# Patient Record
Sex: MALE | Race: WHITE | URBAN - METROPOLITAN AREA
[De-identification: names, ages, dates, MRNs, and addresses within clinical notes are randomized per-mention and may not be internally consistent; named-entity substitution may affect disease eponyms.]

---

## 2018-08-23 ENCOUNTER — OFFICE VISIT (OUTPATIENT)
Dept: URBAN - METROPOLITAN AREA CLINIC 81 | Facility: CLINIC | Age: 73
End: 2018-08-23

## 2018-08-23 DIAGNOSIS — H26.493 OTHER SECONDARY CATARACT, BILATERAL: ICD-10-CM

## 2018-08-23 PROCEDURE — 92014 COMPRE OPH EXAM EST PT 1/>: CPT | Performed by: OPTOMETRIST

## 2018-08-23 ASSESSMENT — VISUAL ACUITY
OD: 20/25
OS: 20/20

## 2018-08-23 ASSESSMENT — INTRAOCULAR PRESSURE
OD: 9
OS: 10

## 2018-08-23 NOTE — IMPRESSION/PLAN
Impression: Other secondary cataract, bilateral: H26.493. Pt will think about YAG surgery in future. Plan: Pt understands that condition can be influencing BVA. Discussed possible YAG consult.

## 2018-08-23 NOTE — IMPRESSION/PLAN
Impression: Dry eye syndrome of bilateral lacrimal glands: H04.123. Plan: Reviewed AT with lid closure, gel or gwen hs & omega 3's.

## 2018-08-23 NOTE — IMPRESSION/PLAN
Impression: Presence of intraocular lens: Z96.1. Plan: Will continue to observe/monitor. UV protection. Pt understands.

## 2018-08-23 NOTE — IMPRESSION/PLAN
Impression: Vitreous degeneration, bilateral: H43.813. Plan: No signs of retinal hole, tear or detachment. Reviewed SSRD, discussed potential seriousness of condition, and importance to RTC immediately if detect any SSRD, pt understands.

## 2018-08-23 NOTE — IMPRESSION/PLAN
Impression: Presbyopia: H52.4. Plan: Glasses Rx update option given. Recommend UV protection. Discussed adaptation, pt understands.

## 2020-01-13 ENCOUNTER — OFFICE VISIT (OUTPATIENT)
Dept: URBAN - METROPOLITAN AREA CLINIC 81 | Facility: CLINIC | Age: 75
End: 2020-01-13

## 2020-01-13 DIAGNOSIS — H52.4 PRESBYOPIA: Primary | ICD-10-CM

## 2020-01-13 DIAGNOSIS — H04.123 DRY EYE SYNDROME OF BILATERAL LACRIMAL GLANDS: ICD-10-CM

## 2020-01-13 PROCEDURE — 92014 COMPRE OPH EXAM EST PT 1/>: CPT | Performed by: OPTOMETRIST

## 2020-01-13 ASSESSMENT — KERATOMETRY
OS: 42.13
OD: 42.00

## 2020-01-13 ASSESSMENT — VISUAL ACUITY
OS: 20/20
OD: 20/20

## 2020-01-13 ASSESSMENT — INTRAOCULAR PRESSURE
OD: 12
OS: 12

## 2020-01-13 NOTE — IMPRESSION/PLAN
Impression: Presbyopia: H52.4. Plan: New spec Rx given - Discussed changes and possible adaptation period. 

RTC 1 year/PRN

## 2020-01-13 NOTE — IMPRESSION/PLAN
Impression: Other secondary cataract, bilateral: H26.493. 

OD>OS - vision ok OU Plan: Monitor - Discussed YAG cap - hold until vision worsens/symptomatic

## 2021-06-17 ENCOUNTER — OFFICE VISIT (OUTPATIENT)
Dept: URBAN - METROPOLITAN AREA CLINIC 81 | Facility: CLINIC | Age: 76
End: 2021-06-17

## 2021-06-17 DIAGNOSIS — Z96.1 PRESENCE OF INTRAOCULAR LENS: ICD-10-CM

## 2021-06-17 DIAGNOSIS — H43.813 VITREOUS DEGENERATION, BILATERAL: ICD-10-CM

## 2021-06-17 PROCEDURE — 92014 COMPRE OPH EXAM EST PT 1/>: CPT | Performed by: OPTOMETRIST

## 2021-06-17 ASSESSMENT — INTRAOCULAR PRESSURE
OS: 12
OD: 13

## 2021-06-17 ASSESSMENT — KERATOMETRY
OS: 42.38
OD: 42.25

## 2021-06-17 NOTE — IMPRESSION/PLAN
Impression: Diagnosis: Vitreous degeneration, bilateral. Code: W10.367. Plan: Posterior vitreous detachment accounts for the patient's complaints. Discussed signs and symptoms of PVD/floaters. Signs and symptoms of retinal detachment were discussed in detail. There is no evidence of retina pathology. No treatment is required at this time. Patient instructed to call immediately if any signs or symptoms of retinal detachments occur.

## 2021-06-17 NOTE — IMPRESSION/PLAN
Impression: Other secondary cataract, bilateral: H26.493. Plan: Discussed diagnosis with patient in detail, stable OU. Not affecting patients daily life. No treatment is required at this time. Will continue to observe condition and/or symptoms. Patient instructed to call if condition gets worse.

## 2022-06-01 ENCOUNTER — OFFICE VISIT (OUTPATIENT)
Dept: URBAN - METROPOLITAN AREA CLINIC 81 | Facility: CLINIC | Age: 77
End: 2022-06-01
Payer: COMMERCIAL

## 2022-06-01 DIAGNOSIS — H43.813 VITREOUS DEGENERATION, BILATERAL: ICD-10-CM

## 2022-06-01 DIAGNOSIS — H52.4 PRESBYOPIA: ICD-10-CM

## 2022-06-01 DIAGNOSIS — H26.493 OTHER SECONDARY CATARACT, BILATERAL: Primary | ICD-10-CM

## 2022-06-01 PROCEDURE — 92014 COMPRE OPH EXAM EST PT 1/>: CPT | Performed by: OPTOMETRIST

## 2022-06-01 ASSESSMENT — INTRAOCULAR PRESSURE
OD: 13
OS: 13

## 2022-06-01 ASSESSMENT — KERATOMETRY
OD: 42.00
OS: 42.00

## 2022-06-01 ASSESSMENT — VISUAL ACUITY
OS: 20/25-
OD: 20/30

## 2022-06-01 NOTE — IMPRESSION/PLAN
Impression: Other secondary cataract, bilateral: H26.493 Bilateral. Plan: Discussed diagnosis with patient in detail. No treatment is required at this time. Will continue to observe condition and/or symptoms. Patient instructed to call if condition gets worse.

## 2023-06-02 ENCOUNTER — OFFICE VISIT (OUTPATIENT)
Dept: URBAN - METROPOLITAN AREA CLINIC 81 | Facility: CLINIC | Age: 78
End: 2023-06-02
Payer: COMMERCIAL

## 2023-06-02 DIAGNOSIS — H26.493 OTHER SECONDARY CATARACT, BILATERAL: Primary | ICD-10-CM

## 2023-06-02 DIAGNOSIS — H43.813 VITREOUS DEGENERATION, BILATERAL: ICD-10-CM

## 2023-06-02 DIAGNOSIS — H52.4 PRESBYOPIA: ICD-10-CM

## 2023-06-02 PROCEDURE — 92014 COMPRE OPH EXAM EST PT 1/>: CPT | Performed by: OPTOMETRIST

## 2023-06-02 ASSESSMENT — KERATOMETRY
OS: 42.25
OD: 42.50

## 2023-06-02 ASSESSMENT — VISUAL ACUITY: OD: 20/25

## 2023-06-02 NOTE — IMPRESSION/PLAN
Impression: Other secondary cataract, bilateral: H26.493.
-greater OD>OS Plan: Discussed diagnosis with patient in detail. No treatment is required at this time. Will continue to observe condition and/or symptoms. Patient instructed to call if vision changes occur.